# Patient Record
Sex: MALE | Race: WHITE | NOT HISPANIC OR LATINO | Employment: UNEMPLOYED | URBAN - NONMETROPOLITAN AREA
[De-identification: names, ages, dates, MRNs, and addresses within clinical notes are randomized per-mention and may not be internally consistent; named-entity substitution may affect disease eponyms.]

---

## 2023-03-23 ENCOUNTER — HOSPITAL ENCOUNTER (EMERGENCY)
Facility: HOSPITAL | Age: 23
Discharge: PSYCHIATRIC OR SUBSTANCE USE FACILITY | DRG: 897 | End: 2023-03-23
Attending: EMERGENCY MEDICINE | Admitting: EMERGENCY MEDICINE
Payer: COMMERCIAL

## 2023-03-23 ENCOUNTER — HOSPITAL ENCOUNTER (INPATIENT)
Facility: HOSPITAL | Age: 23
LOS: 3 days | Discharge: NURSING, REHAB OR LONG-TERM CARE | DRG: 897 | End: 2023-03-27
Attending: PSYCHIATRY & NEUROLOGY | Admitting: PSYCHIATRY & NEUROLOGY
Payer: COMMERCIAL

## 2023-03-23 VITALS
HEART RATE: 77 BPM | TEMPERATURE: 98.3 F | RESPIRATION RATE: 18 BRPM | HEIGHT: 68 IN | OXYGEN SATURATION: 98 % | WEIGHT: 135 LBS | BODY MASS INDEX: 20.46 KG/M2 | DIASTOLIC BLOOD PRESSURE: 72 MMHG | SYSTOLIC BLOOD PRESSURE: 134 MMHG

## 2023-03-23 DIAGNOSIS — F19.10 SUBSTANCE ABUSE: Primary | ICD-10-CM

## 2023-03-23 PROBLEM — F11.10 OPIATE ABUSE, CONTINUOUS: Status: ACTIVE | Noted: 2023-03-23

## 2023-03-23 LAB
ALBUMIN SERPL-MCNC: 4.6 G/DL (ref 3.5–5.2)
ALBUMIN/GLOB SERPL: 2.1 G/DL
ALP SERPL-CCNC: 82 U/L (ref 39–117)
ALT SERPL W P-5'-P-CCNC: 10 U/L (ref 1–41)
AMPHET+METHAMPHET UR QL: NEGATIVE
AMPHETAMINES UR QL: NEGATIVE
ANION GAP SERPL CALCULATED.3IONS-SCNC: 11.6 MMOL/L (ref 5–15)
APAP SERPL-MCNC: <5 MCG/ML (ref 0–30)
AST SERPL-CCNC: 17 U/L (ref 1–40)
BARBITURATES UR QL SCN: NEGATIVE
BASOPHILS # BLD AUTO: 0.06 10*3/MM3 (ref 0–0.2)
BASOPHILS NFR BLD AUTO: 0.7 % (ref 0–1.5)
BENZODIAZ UR QL SCN: NEGATIVE
BILIRUB SERPL-MCNC: 0.5 MG/DL (ref 0–1.2)
BUN SERPL-MCNC: 3 MG/DL (ref 6–20)
BUN/CREAT SERPL: 3.9 (ref 7–25)
BUPRENORPHINE SERPL-MCNC: NEGATIVE NG/ML
CALCIUM SPEC-SCNC: 9.5 MG/DL (ref 8.6–10.5)
CANNABINOIDS SERPL QL: POSITIVE
CHLORIDE SERPL-SCNC: 99 MMOL/L (ref 98–107)
CO2 SERPL-SCNC: 26.4 MMOL/L (ref 22–29)
COCAINE UR QL: NEGATIVE
CREAT SERPL-MCNC: 0.77 MG/DL (ref 0.76–1.27)
DEPRECATED RDW RBC AUTO: 37 FL (ref 37–54)
EGFRCR SERPLBLD CKD-EPI 2021: 131.4 ML/MIN/1.73
EOSINOPHIL # BLD AUTO: 0.17 10*3/MM3 (ref 0–0.4)
EOSINOPHIL NFR BLD AUTO: 1.9 % (ref 0.3–6.2)
ERYTHROCYTE [DISTWIDTH] IN BLOOD BY AUTOMATED COUNT: 11.7 % (ref 12.3–15.4)
ETHANOL BLD-MCNC: <10 MG/DL (ref 0–10)
ETHANOL UR QL: <0.01 %
FLUAV RNA RESP QL NAA+PROBE: NOT DETECTED
FLUBV RNA RESP QL NAA+PROBE: NOT DETECTED
GLOBULIN UR ELPH-MCNC: 2.2 GM/DL
GLUCOSE SERPL-MCNC: 107 MG/DL (ref 65–99)
HCT VFR BLD AUTO: 40.5 % (ref 37.5–51)
HGB BLD-MCNC: 14.3 G/DL (ref 13–17.7)
HOLD SPECIMEN: NORMAL
HOLD SPECIMEN: NORMAL
IMM GRANULOCYTES # BLD AUTO: 0.01 10*3/MM3 (ref 0–0.05)
IMM GRANULOCYTES NFR BLD AUTO: 0.1 % (ref 0–0.5)
LYMPHOCYTES # BLD AUTO: 2.64 10*3/MM3 (ref 0.7–3.1)
LYMPHOCYTES NFR BLD AUTO: 29.2 % (ref 19.6–45.3)
MCH RBC QN AUTO: 30.6 PG (ref 26.6–33)
MCHC RBC AUTO-ENTMCNC: 35.3 G/DL (ref 31.5–35.7)
MCV RBC AUTO: 86.5 FL (ref 79–97)
METHADONE UR QL SCN: NEGATIVE
MONOCYTES # BLD AUTO: 0.64 10*3/MM3 (ref 0.1–0.9)
MONOCYTES NFR BLD AUTO: 7.1 % (ref 5–12)
NEUTROPHILS NFR BLD AUTO: 5.52 10*3/MM3 (ref 1.7–7)
NEUTROPHILS NFR BLD AUTO: 61 % (ref 42.7–76)
NRBC BLD AUTO-RTO: 0 /100 WBC (ref 0–0.2)
OPIATES UR QL: NEGATIVE
OXYCODONE UR QL SCN: NEGATIVE
PCP UR QL SCN: NEGATIVE
PLATELET # BLD AUTO: 245 10*3/MM3 (ref 140–450)
PMV BLD AUTO: 10.3 FL (ref 6–12)
POTASSIUM SERPL-SCNC: 3.6 MMOL/L (ref 3.5–5.2)
PROPOXYPH UR QL: NEGATIVE
PROT SERPL-MCNC: 6.8 G/DL (ref 6–8.5)
RBC # BLD AUTO: 4.68 10*6/MM3 (ref 4.14–5.8)
SALICYLATES SERPL-MCNC: 0.4 MG/DL
SARS-COV-2 RNA RESP QL NAA+PROBE: NOT DETECTED
SODIUM SERPL-SCNC: 137 MMOL/L (ref 136–145)
TRICYCLICS UR QL SCN: NEGATIVE
WBC NRBC COR # BLD: 9.04 10*3/MM3 (ref 3.4–10.8)
WHOLE BLOOD HOLD COAG: NORMAL
WHOLE BLOOD HOLD SPECIMEN: NORMAL

## 2023-03-23 PROCEDURE — 87636 SARSCOV2 & INF A&B AMP PRB: CPT | Performed by: STUDENT IN AN ORGANIZED HEALTH CARE EDUCATION/TRAINING PROGRAM

## 2023-03-23 PROCEDURE — 93005 ELECTROCARDIOGRAM TRACING: CPT | Performed by: PSYCHIATRY & NEUROLOGY

## 2023-03-23 PROCEDURE — 80306 DRUG TEST PRSMV INSTRMNT: CPT | Performed by: STUDENT IN AN ORGANIZED HEALTH CARE EDUCATION/TRAINING PROGRAM

## 2023-03-23 PROCEDURE — 80179 DRUG ASSAY SALICYLATE: CPT | Performed by: STUDENT IN AN ORGANIZED HEALTH CARE EDUCATION/TRAINING PROGRAM

## 2023-03-23 PROCEDURE — 82077 ASSAY SPEC XCP UR&BREATH IA: CPT | Performed by: STUDENT IN AN ORGANIZED HEALTH CARE EDUCATION/TRAINING PROGRAM

## 2023-03-23 PROCEDURE — 36415 COLL VENOUS BLD VENIPUNCTURE: CPT

## 2023-03-23 PROCEDURE — 80143 DRUG ASSAY ACETAMINOPHEN: CPT | Performed by: STUDENT IN AN ORGANIZED HEALTH CARE EDUCATION/TRAINING PROGRAM

## 2023-03-23 PROCEDURE — C9803 HOPD COVID-19 SPEC COLLECT: HCPCS

## 2023-03-23 PROCEDURE — 99284 EMERGENCY DEPT VISIT MOD MDM: CPT

## 2023-03-23 PROCEDURE — 80053 COMPREHEN METABOLIC PANEL: CPT | Performed by: STUDENT IN AN ORGANIZED HEALTH CARE EDUCATION/TRAINING PROGRAM

## 2023-03-23 PROCEDURE — G0378 HOSPITAL OBSERVATION PER HR: HCPCS

## 2023-03-23 PROCEDURE — 85025 COMPLETE CBC W/AUTO DIFF WBC: CPT | Performed by: STUDENT IN AN ORGANIZED HEALTH CARE EDUCATION/TRAINING PROGRAM

## 2023-03-23 RX ORDER — ONDANSETRON 4 MG/1
4 TABLET, FILM COATED ORAL EVERY 6 HOURS PRN
Status: DISCONTINUED | OUTPATIENT
Start: 2023-03-23 | End: 2023-03-27 | Stop reason: HOSPADM

## 2023-03-23 RX ORDER — LOPERAMIDE HYDROCHLORIDE 2 MG/1
2 CAPSULE ORAL
Status: DISCONTINUED | OUTPATIENT
Start: 2023-03-23 | End: 2023-03-27 | Stop reason: HOSPADM

## 2023-03-23 RX ORDER — ALUMINA, MAGNESIA, AND SIMETHICONE 2400; 2400; 240 MG/30ML; MG/30ML; MG/30ML
15 SUSPENSION ORAL EVERY 6 HOURS PRN
Status: DISCONTINUED | OUTPATIENT
Start: 2023-03-23 | End: 2023-03-27 | Stop reason: HOSPADM

## 2023-03-23 RX ORDER — TRAZODONE HYDROCHLORIDE 50 MG/1
50 TABLET ORAL NIGHTLY PRN
Status: DISCONTINUED | OUTPATIENT
Start: 2023-03-23 | End: 2023-03-25

## 2023-03-23 RX ORDER — BENZTROPINE MESYLATE 1 MG/1
2 TABLET ORAL ONCE AS NEEDED
Status: DISCONTINUED | OUTPATIENT
Start: 2023-03-23 | End: 2023-03-27 | Stop reason: HOSPADM

## 2023-03-23 RX ORDER — ECHINACEA PURPUREA EXTRACT 125 MG
2 TABLET ORAL AS NEEDED
Status: DISCONTINUED | OUTPATIENT
Start: 2023-03-23 | End: 2023-03-27 | Stop reason: HOSPADM

## 2023-03-23 RX ORDER — BENZONATATE 100 MG/1
100 CAPSULE ORAL 3 TIMES DAILY PRN
Status: DISCONTINUED | OUTPATIENT
Start: 2023-03-23 | End: 2023-03-27 | Stop reason: HOSPADM

## 2023-03-23 RX ORDER — NICOTINE 21 MG/24HR
1 PATCH, TRANSDERMAL 24 HOURS TRANSDERMAL
Status: DISCONTINUED | OUTPATIENT
Start: 2023-03-24 | End: 2023-03-27 | Stop reason: HOSPADM

## 2023-03-23 RX ORDER — BENZTROPINE MESYLATE 1 MG/ML
1 INJECTION INTRAMUSCULAR; INTRAVENOUS ONCE AS NEEDED
Status: DISCONTINUED | OUTPATIENT
Start: 2023-03-23 | End: 2023-03-27 | Stop reason: HOSPADM

## 2023-03-23 RX ORDER — HYDROXYZINE 50 MG/1
50 TABLET, FILM COATED ORAL EVERY 6 HOURS PRN
Status: DISCONTINUED | OUTPATIENT
Start: 2023-03-23 | End: 2023-03-27 | Stop reason: HOSPADM

## 2023-03-23 RX ORDER — FAMOTIDINE 20 MG/1
20 TABLET, FILM COATED ORAL 2 TIMES DAILY PRN
Status: DISCONTINUED | OUTPATIENT
Start: 2023-03-23 | End: 2023-03-27 | Stop reason: HOSPADM

## 2023-03-23 RX ORDER — IBUPROFEN 400 MG/1
400 TABLET ORAL EVERY 6 HOURS PRN
Status: DISCONTINUED | OUTPATIENT
Start: 2023-03-23 | End: 2023-03-27 | Stop reason: HOSPADM

## 2023-03-23 RX ORDER — ACETAMINOPHEN 325 MG/1
650 TABLET ORAL EVERY 6 HOURS PRN
Status: DISCONTINUED | OUTPATIENT
Start: 2023-03-23 | End: 2023-03-27 | Stop reason: HOSPADM

## 2023-03-24 PROBLEM — F17.200 NICOTINE USE DISORDER: Status: ACTIVE | Noted: 2023-03-24

## 2023-03-24 PROBLEM — F11.20 OPIOID USE DISORDER, SEVERE, DEPENDENCE: Status: ACTIVE | Noted: 2023-03-23

## 2023-03-24 LAB
QT INTERVAL: 396 MS
QTC INTERVAL: 411 MS

## 2023-03-24 PROCEDURE — 63710000001 ONDANSETRON PER 8 MG: Performed by: PSYCHIATRY & NEUROLOGY

## 2023-03-24 PROCEDURE — G0378 HOSPITAL OBSERVATION PER HR: HCPCS

## 2023-03-24 PROCEDURE — 99222 1ST HOSP IP/OBS MODERATE 55: CPT | Performed by: PSYCHIATRY & NEUROLOGY

## 2023-03-24 RX ORDER — CLONIDINE HYDROCHLORIDE 0.1 MG/1
0.1 TABLET ORAL 2 TIMES DAILY PRN
Status: DISCONTINUED | OUTPATIENT
Start: 2023-03-27 | End: 2023-03-27 | Stop reason: HOSPADM

## 2023-03-24 RX ORDER — CLONIDINE HYDROCHLORIDE 0.1 MG/1
0.1 TABLET ORAL 3 TIMES DAILY PRN
Status: DISPENSED | OUTPATIENT
Start: 2023-03-26 | End: 2023-03-27

## 2023-03-24 RX ORDER — DICYCLOMINE HYDROCHLORIDE 10 MG/1
10 CAPSULE ORAL 3 TIMES DAILY PRN
Status: DISCONTINUED | OUTPATIENT
Start: 2023-03-24 | End: 2023-03-27 | Stop reason: HOSPADM

## 2023-03-24 RX ORDER — CLONIDINE HYDROCHLORIDE 0.1 MG/1
0.1 TABLET ORAL 4 TIMES DAILY PRN
Status: DISPENSED | OUTPATIENT
Start: 2023-03-25 | End: 2023-03-26

## 2023-03-24 RX ORDER — CLONIDINE HYDROCHLORIDE 0.1 MG/1
0.1 TABLET ORAL ONCE AS NEEDED
Status: DISCONTINUED | OUTPATIENT
Start: 2023-03-28 | End: 2023-03-27 | Stop reason: HOSPADM

## 2023-03-24 RX ORDER — CLONIDINE HYDROCHLORIDE 0.1 MG/1
0.1 TABLET ORAL 4 TIMES DAILY PRN
Status: DISPENSED | OUTPATIENT
Start: 2023-03-24 | End: 2023-03-25

## 2023-03-24 RX ORDER — CYCLOBENZAPRINE HCL 10 MG
10 TABLET ORAL 3 TIMES DAILY PRN
Status: DISCONTINUED | OUTPATIENT
Start: 2023-03-24 | End: 2023-03-27 | Stop reason: HOSPADM

## 2023-03-24 RX ORDER — HYDRALAZINE HYDROCHLORIDE 25 MG/1
25 TABLET, FILM COATED ORAL DAILY PRN
Status: DISCONTINUED | OUTPATIENT
Start: 2023-03-24 | End: 2023-03-27 | Stop reason: HOSPADM

## 2023-03-24 RX ADMIN — HYDROXYZINE HYDROCHLORIDE 50 MG: 50 TABLET, FILM COATED ORAL at 08:29

## 2023-03-24 RX ADMIN — TRAZODONE HYDROCHLORIDE 50 MG: 50 TABLET ORAL at 21:02

## 2023-03-24 RX ADMIN — CYCLOBENZAPRINE 10 MG: 10 TABLET, FILM COATED ORAL at 21:03

## 2023-03-24 RX ADMIN — IBUPROFEN 400 MG: 400 TABLET, FILM COATED ORAL at 08:29

## 2023-03-24 RX ADMIN — ACETAMINOPHEN 650 MG: 325 TABLET ORAL at 14:03

## 2023-03-24 RX ADMIN — ONDANSETRON HYDROCHLORIDE 4 MG: 4 TABLET, FILM COATED ORAL at 14:03

## 2023-03-24 RX ADMIN — IBUPROFEN 400 MG: 400 TABLET, FILM COATED ORAL at 21:03

## 2023-03-24 RX ADMIN — HYDROXYZINE HYDROCHLORIDE 50 MG: 50 TABLET, FILM COATED ORAL at 21:03

## 2023-03-24 RX ADMIN — HYDROXYZINE HYDROCHLORIDE 50 MG: 50 TABLET, FILM COATED ORAL at 00:12

## 2023-03-24 RX ADMIN — CLONIDINE HYDROCHLORIDE 0.1 MG: 0.1 TABLET ORAL at 21:02

## 2023-03-24 RX ADMIN — TRAZODONE HYDROCHLORIDE 50 MG: 50 TABLET ORAL at 00:12

## 2023-03-24 RX ADMIN — HYDROXYZINE HYDROCHLORIDE 50 MG: 50 TABLET, FILM COATED ORAL at 14:03

## 2023-03-24 RX ADMIN — FAMOTIDINE 20 MG: 20 TABLET, FILM COATED ORAL at 08:29

## 2023-03-24 NOTE — ED PROVIDER NOTES
Subjective   History of Present Illness  Patient is a 22-year-old male who was just admitted to [facility] residential rehab today for his fentanyl use.  They sent him here for medical detox.  Patient reports his last use was this morning.  He denies any symptoms.  States he feels fine, completely normal.  No withdrawal symptoms, at least as of yet.  He denies suicidal homicidal ideations, auditory visual loose Nations, other symptoms or other complaints.  He reports that he has never used intravenously, he smokes the fentanyl.        Review of Systems   All other systems reviewed and are negative.      Past Medical History:   Diagnosis Date   • Substance abuse (HCC)    • Withdrawal symptoms, drug or narcotic (HCC)        No Known Allergies    History reviewed. No pertinent surgical history.    History reviewed. No pertinent family history.    Social History     Socioeconomic History   • Marital status: Single   Tobacco Use   • Smoking status: Never   • Smokeless tobacco: Never   Vaping Use   • Vaping Use: Every day   • Substances: Nicotine, Flavoring   • Devices: Disposable   Substance and Sexual Activity   • Alcohol use: Never   • Drug use: Yes     Types: Fentanyl   • Sexual activity: Yes     Partners: Female           Objective   Physical Exam  Vitals and nursing note reviewed.   Constitutional:       General: He is not in acute distress.     Appearance: Normal appearance. He is well-developed. He is not ill-appearing, toxic-appearing or diaphoretic.   HENT:      Head: Normocephalic and atraumatic.   Eyes:      General: No scleral icterus.     Pupils: Pupils are equal, round, and reactive to light.   Neck:      Trachea: No tracheal deviation.   Cardiovascular:      Rate and Rhythm: Normal rate and regular rhythm.   Pulmonary:      Effort: Pulmonary effort is normal. No respiratory distress.      Breath sounds: Normal breath sounds.   Chest:      Chest wall: No tenderness.   Abdominal:      General: Bowel sounds are  normal.      Palpations: Abdomen is soft.      Tenderness: There is no abdominal tenderness. There is no guarding or rebound.   Musculoskeletal:         General: No tenderness. Normal range of motion.      Cervical back: Normal range of motion and neck supple. No rigidity or tenderness.      Right lower leg: No edema.      Left lower leg: No edema.   Skin:     General: Skin is warm and dry.      Capillary Refill: Capillary refill takes less than 2 seconds.      Coloration: Skin is not pale.   Neurological:      General: No focal deficit present.      Mental Status: He is alert and oriented to person, place, and time.      GCS: GCS eye subscore is 4. GCS verbal subscore is 5. GCS motor subscore is 6.      Motor: No abnormal muscle tone.   Psychiatric:         Mood and Affect: Mood normal.         Behavior: Behavior normal.         Procedures  Results for orders placed or performed during the hospital encounter of 03/23/23   COVID-19 and FLU A/B PCR - Swab, Nasopharynx    Specimen: Nasopharynx; Swab   Result Value Ref Range    COVID19 Not Detected Not Detected - Ref. Range    Influenza A PCR Not Detected Not Detected    Influenza B PCR Not Detected Not Detected   Comprehensive Metabolic Panel    Specimen: Arm, Right; Blood   Result Value Ref Range    Glucose 107 (H) 65 - 99 mg/dL    BUN 3 (L) 6 - 20 mg/dL    Creatinine 0.77 0.76 - 1.27 mg/dL    Sodium 137 136 - 145 mmol/L    Potassium 3.6 3.5 - 5.2 mmol/L    Chloride 99 98 - 107 mmol/L    CO2 26.4 22.0 - 29.0 mmol/L    Calcium 9.5 8.6 - 10.5 mg/dL    Total Protein 6.8 6.0 - 8.5 g/dL    Albumin 4.6 3.5 - 5.2 g/dL    ALT (SGPT) 10 1 - 41 U/L    AST (SGOT) 17 1 - 40 U/L    Alkaline Phosphatase 82 39 - 117 U/L    Total Bilirubin 0.5 0.0 - 1.2 mg/dL    Globulin 2.2 gm/dL    A/G Ratio 2.1 g/dL    BUN/Creatinine Ratio 3.9 (L) 7.0 - 25.0    Anion Gap 11.6 5.0 - 15.0 mmol/L    eGFR 131.4 >60.0 mL/min/1.73   Acetaminophen Level    Specimen: Arm, Right; Blood   Result Value Ref  Range    Acetaminophen <5.0 0.0 - 30.0 mcg/mL   Ethanol    Specimen: Arm, Right; Blood   Result Value Ref Range    Ethanol <10 0 - 10 mg/dL    Ethanol % <0.010 %   Salicylate Level    Specimen: Arm, Right; Blood   Result Value Ref Range    Salicylate 0.4 <=30.0 mg/dL   Urine Drug Screen - Urine, Clean Catch    Specimen: Urine, Clean Catch   Result Value Ref Range    THC, Screen, Urine Positive (A) Negative    Phencyclidine (PCP), Urine Negative Negative    Cocaine Screen, Urine Negative Negative    Methamphetamine, Ur Negative Negative    Opiate Screen Negative Negative    Amphetamine Screen, Urine Negative Negative    Benzodiazepine Screen, Urine Negative Negative    Tricyclic Antidepressants Screen Negative Negative    Methadone Screen, Urine Negative Negative    Barbiturates Screen, Urine Negative Negative    Oxycodone Screen, Urine Negative Negative    Propoxyphene Screen Negative Negative    Buprenorphine, Screen, Urine Negative Negative   CBC Auto Differential    Specimen: Arm, Right; Blood   Result Value Ref Range    WBC 9.04 3.40 - 10.80 10*3/mm3    RBC 4.68 4.14 - 5.80 10*6/mm3    Hemoglobin 14.3 13.0 - 17.7 g/dL    Hematocrit 40.5 37.5 - 51.0 %    MCV 86.5 79.0 - 97.0 fL    MCH 30.6 26.6 - 33.0 pg    MCHC 35.3 31.5 - 35.7 g/dL    RDW 11.7 (L) 12.3 - 15.4 %    RDW-SD 37.0 37.0 - 54.0 fl    MPV 10.3 6.0 - 12.0 fL    Platelets 245 140 - 450 10*3/mm3    Neutrophil % 61.0 42.7 - 76.0 %    Lymphocyte % 29.2 19.6 - 45.3 %    Monocyte % 7.1 5.0 - 12.0 %    Eosinophil % 1.9 0.3 - 6.2 %    Basophil % 0.7 0.0 - 1.5 %    Immature Grans % 0.1 0.0 - 0.5 %    Neutrophils, Absolute 5.52 1.70 - 7.00 10*3/mm3    Lymphocytes, Absolute 2.64 0.70 - 3.10 10*3/mm3    Monocytes, Absolute 0.64 0.10 - 0.90 10*3/mm3    Eosinophils, Absolute 0.17 0.00 - 0.40 10*3/mm3    Basophils, Absolute 0.06 0.00 - 0.20 10*3/mm3    Immature Grans, Absolute 0.01 0.00 - 0.05 10*3/mm3    nRBC 0.0 0.0 - 0.2 /100 WBC   Green Top (Gel)   Result Value  Ref Range    Extra Tube Hold for add-ons.    Lavender Top   Result Value Ref Range    Extra Tube hold for add-on    Gold Top - SST   Result Value Ref Range    Extra Tube Hold for add-ons.    Light Blue Top   Result Value Ref Range    Extra Tube Hold for add-ons.               ED Course  ED Course as of 03/23/23 2244   Thu Mar 23, 2023   2243 Patient's emergency department stay has been uneventful.  He has shown no signs of acute distress.  He has been evaluated by psychiatry intake.  Patient is being admitted to the detox unit/discharged to behavioral health. [CM]      ED Course User Index  [CM] Munir León MD                                           [facility]    Final diagnoses:   Substance abuse (HCC)       ED Disposition  ED Disposition     ED Disposition   DC/Transfer to Behavioral Health    Condition   Stable    Comment   --             Please note that portions of this note were completed with a voice recognition program.            Munir León MD  03/23/23 2245

## 2023-03-24 NOTE — NURSING NOTE
"Patient came to nursing station requesting to leave AMA. Patient then called his mother and she wouldn't come and get him. He made the statement \"I ain't about sitting here.\" This RN, explained to patient that he could leave AMA I just had to get a hold of the DrHailey And get an order. Patient states, \"thats alright.\" This RN, ask patient so are you staying or are you wanting to discharge AMA. Patient says he is staying then states, \"this is like [facility] I would rather be in [facility].\" \"Whats the point in me sitting here. Explained to patient that the rehab wanted him to be medically cleared to make sure he safely detoxed from fentanyl.   "

## 2023-03-24 NOTE — NURSING NOTE
Patient brought by [facility] for medical detox. Patient reports going to senior living in 2022, then to rehab in 2023. He reports relapse a couple of days after leaving rehab. He began using opiates at age 19. He has been smoking Fentanyl most recently in the last week- amounts unknown. He reports occassional past use of MJ. Patient was raised by his grandparents who he calls his parents. His father  when patient was 7 years old. Patient reports no contact with mother. Anxiety and depression rated 8/10, Craving rated 0/10, COWS score: 2. He reports good appetite. He reports sleeping one hour last night and one hour for the last several nights. He has taken Zoloft in the past for A/D. Most recently he was prescribed Seroquel and Buspar. He plans on returning to [facility] for court ordered rehab.

## 2023-03-24 NOTE — ED NOTES
MEDICAL SCREENING:    Reason for Visit: detox    Patient initially seen in triage.  The patient was advised further evaluation and diagnostic testing will be needed, some of the treatment and testing will be initiated in the lobby in order to begin the process.  The patient will be returned to the waiting area for the time being and possibly be re-assessed by a subsequent ED provider.  The patient will be brought back to the treatment area in as timely manner as possible.       Leola Saenz MD  03/23/23 2032

## 2023-03-24 NOTE — PLAN OF CARE
Goal Outcome Evaluation:           Problem: Adult Behavioral Health Plan of Care  Goal: Patient-Specific Goal (Individualization)  Outcome: Ongoing, Progressing  Flowsheets  Taken 3/24/2023 1431 by Katharine Patterson CSW  Patient-Specific Goals (Include Timeframe): Identify 2-3 coping skills, address relapse prevention methods, complete aftercare plans, and deny SI/HI prior to discharge.  Individualized Care Needs: Therapist to offer 1-4 therapy sessions, aftercare planning, safety planning, family education, group therapy, and brief CBT/MI interventions.  Taken 3/24/2023 1335 by Katharine Patterson CSW  Patient Personal Strengths:   resilient   resourceful   positive educational history   stable living environment   spiritual/Quaker support   intellectual cognitive skills   motivated for recovery   motivated for treatment   family/social support  Patient Vulnerabilities:   substance abuse/addiction   poor impulse control   occupational insecurity   history of unsuccessful treatment   legal concerns  Taken 3/24/2023 0010 by Liberty Figueredo RN  Anxieties, Fears or Concerns: none reported  Goal: Optimized Coping Skills in Response to Life Stressors  Outcome: Ongoing, Progressing  Flowsheets (Taken 3/24/2023 1431)  Optimized Coping Skills in Response to Life Stressors: making progress toward outcome  Intervention: Promote Effective Coping Strategies  Flowsheets (Taken 3/24/2023 1431)  Supportive Measures:   active listening utilized   counseling provided   decision-making supported   goal-setting facilitated   verbalization of feelings encouraged   positive reinforcement provided   self-responsibility promoted   self-reflection promoted   self-care encouraged  Goal: Develops/Participates in Therapeutic [place] to Support Successful Transition  Outcome: Ongoing, Progressing  Flowsheets (Taken 3/24/2023 1431)  Develops/Participates in Therapeutic [place] to Support Successful Transition: making progress toward  outcome  Intervention: Foster Therapeutic [place]  Flowsheets (Taken 3/24/2023 0825 by Tracie Marques RN)  Trust Relationship/Rapport:   care explained   choices provided   emotional support provided   empathic listening provided   questions answered   questions encouraged   reassurance provided   thoughts/feelings acknowledged  Intervention: Mutually Develop Transition Plan  Flowsheets  Taken 3/24/2023 1431  Outpatient/Agency/Support Group Needs: residential services  Transition Support:   follow-up care discussed   follow-up care coordinated   crisis management plan verbalized   crisis management plan promoted   community resources reviewed  Anticipated Discharge Disposition: residential substance use unit  Taken 3/24/2023 1430  Discharge Coordination/Progress: Patient has [employer]. Therapist met with patient to complete assessment. Patient to return to [facility], [place] will transport.  Transportation Anticipated: agency  Transportation Concerns: (license suspended) other (see comments)  Current Discharge Risk:   substance use/abuse   psychiatric illness   legal concerns  Concerns to be Addressed:   mental health   substance/tobacco abuse/use   coping/stress   adjustment to diagnosis/illness   discharge planning  Readmission Within the Last 30 Days: no previous admission in last 30 days  Patient/Family Anticipated Services at Transition: rehabilitation services  Patient's Choice of Community Agency(s): [facility]  Patient/Family Anticipates Transition to: inpatient rehabilitation facility  Offered/Gave Vendor List: no      DATA:         Therapist met individually with patient this date to introduce role and to discuss hospitalization expectations. Patient agreeable.     Consent signed for [facility]  Declines family involvement     Clinical Maneuvering/Intervention:     Therapist assisted patient in processing above session content; acknowledged and normalized patient’s thoughts,  feelings, and concerns.  Discussed the therapist/patient relationship and explain the parameters and limitations of relative confidentiality.  Also discussed the importance of active participation, and honesty to the treatment process.  Encouraged the patient to discuss/vent their feelings, frustrations, and fears concerning their ongoing medical issues and validated their feelings.     Discussed the importance of finding enjoyable activities and coping skills that the patient can engage in a regular basis. Discussed healthy coping skills such as distraction, self love, grounding, thought challenges/reframing, etc.  Provided patient with list of healthy coping skills this date. Discussed the importance of medication compliance.  Praised the patient for seeking help and spent the majority of the session building rapport.       Allowed patient to freely discuss issues without interruption or judgment. Provided safe, confidential environment to facilitate the development of positive therapeutic relationship and encourage open, honest communication.      Therapist addressed discharge safety planning this date. Assisted patient in identifying risk factors which would indicate the need for higher level of care after discharge;  including thoughts to harm self or others and/or self-harming behavior. Encouraged patient to call 911, or present to the nearest emergency room should any of these events occur. Discussed crisis intervention services and means to access.  Encouraged securing any objects of harm.       Therapist completed integrated summary, treatment plan, and initiated social history this date.  Therapist is strongly encouraging family involvement in treatment.       ASSESSMENT:      The patient is a 23 y/o  male admitted to observation for opiate detox treatment. Therapist met with patient 1-1 at bedside on this date to complete assessment. Patient calm and cooperative, would not turn over to face  therapist during session. Patient reports high anxiety, denies current SI/HI/AVH. Patient is very focused on getting more meds. Patient reports experiencing body aches, muscle aches, insomnia, and restlessness. Patient reports use of fentanyl, as much as he can get. Patient declines family involvement in treatment plan. Patient has no past admissions to the [facility]. Patient recently got out of [facility] in November of last year and then spent some time in rehab at the beginning of this year. He relapsed a couple days after leaving rehab and has been living with his mom since then. Patient is court ordered to [facility] and is agreeable to return at discharge, consent given. Patient reports he began using at age 16 and longest period of sobriety is reported to be three months. [facility] will provide transportation.      PLAN:       Patient to remain hospitalized this date.     Treatment team will focus efforts on stabilizing patient's acute symptoms while providing education on healthy coping and crisis management to reduce hospitalizations.   Patient requires daily psychiatrist evaluation and 24/7 nursing supervision to promote patient  safety.     Therapist will offer 1-4 individual sessions, 1 therapy group daily, family education, and appropriate referral.    Therapist recommends return to [facility], patient agreeable.

## 2023-03-24 NOTE — PLAN OF CARE
Goal Outcome Evaluation:  Plan of Care Reviewed With: patient  Patient Agreement with Plan of Care: agrees     Progress: improving  Outcome Evaluation: Patient irritable and restless. Rates anxiety 10/10. Depression 9/10. Withdrawal symptoms: body aches, tremors, sweats, leg cramps, and stomach cramps. Denies cravings, SI, HI, or [place]. No other issues noted at this time. Will continue to monitor.

## 2023-03-24 NOTE — PLAN OF CARE
Goal Outcome Evaluation:  Plan of Care Reviewed With: patient  Patient Agreement with Plan of Care: agrees     Progress: improving     Pt new admit for pm shift. Given atarax and trazodone prn medications. Pt slept well.

## 2023-03-24 NOTE — NURSING NOTE
Pt court ordered to detox, states he's detoxing from Fentanyl    Fentanyl-pt states unsure of amount, by smoking, has been using it for 1 week, last used this AM. Was clean for about 3 and a half months prior.     COWS-4    Craving-0/10    Pt states poor sleep, good appetite    Depression-8/10  Anxiety-8/10

## 2023-03-24 NOTE — NURSING NOTE
Spoke to doctor Adamson intake information labs and V/S provided and discussed with provider, instructed to admit with routine [facility] Detox Observation orders [id number]. Patient and ER provider made aware of admitting orders and plan of care.

## 2023-03-24 NOTE — NURSING NOTE
Pt ambulatory from triage. Denies SI/HI/AVH. Pt states he is court ordered rehab and sent here for detox from fentanyl. Pt refuses to go through search process, or scrubs.     Reviewed process and pt decided to stay.

## 2023-03-24 NOTE — H&P
INITIAL PSYCHIATRIC HISTORY & PHYSICAL    Patient Identification:  Name:  Paresh Bansal  Age:  22 y.o.  Sex:  male  :  2000  MRN:  [id number]   Visit Number:  [id number]  Primary Care Physician:  Provider, No Known    SUBJECTIVE    CC/Focus of Exam: opioid use    HPI: Paresh Bansal is a 22 y.o. male who was admitted on 3/23/2023 with complaints of opioid use and withdrawals. The patient reports a long history of substance use. First use was at age 16 years when he used Xanax and at age 18 years he switched over to pain pills. Over time the use increased and the patient  continued to use despite negative consequences. The patient endorses symptoms of tolerance and withdrawals. Has tried to cut down and stop but has not been successful. Spends too much time and resources in pursuit of substance use. Longest period of sobriety is reported to be 3 months.  Currently using fentanyl as much as he can get, usually smokes it.  Last use was yesterday evening.  Withdrawal symptoms muscle aches, body aches, insomnia, feel horrible, restless, anxious.     PAST PSYCHIATRIC HX: one reported.    SUBSTANCE USE HX: See HPI for fentanyl use. Vapes nicotine daily.    SOCIAL HX:   Social History     Socioeconomic History   • Marital status: Single   • Number of children: 0   • Highest education level: High school graduate   Tobacco Use   • Smoking status: Every Day     Packs/day: 1.00     Years: 8.00     Pack years: 8.00     Types: Cigarettes   • Smokeless tobacco: Never   • Tobacco comments:     Started smoking at age 89   Vaping Use   • Vaping Use: Every day   • Substances: Nicotine, Flavoring   • Devices: Disposable   Substance and Sexual Activity   • Alcohol use: Never   • Drug use: Yes     Types: Fentanyl, Marijuana   • Sexual activity: Yes     Partners: Female         Past Medical History:   Diagnosis Date   • Anxiety    • Depression    • Substance abuse (HCC)    • Withdrawal symptoms, drug or narcotic (HCC)          History reviewed. No pertinent surgical history.    History reviewed. No pertinent family history.      No medications prior to admission.         ALLERGIES:  Patient has no known allergies.    Temp:  [97.4 °F (36.3 °C)-98.3 °F (36.8 °C)] 97.9 °F (36.6 °C)  Heart Rate:  [69-77] 69  Resp:  [16-18] 16  BP: (110-134)/(72-81) 110/72    REVIEW OF SYSTEMS:  Review of Systems   Constitutional: Positive for fatigue.   HENT: Negative.    Eyes: Negative.    Respiratory: Negative.    Cardiovascular: Negative.    Gastrointestinal: Positive for abdominal pain.   Musculoskeletal: Positive for arthralgias and myalgias.   Skin: Negative.    Allergic/Immunologic: Negative.    Neurological: Positive for weakness.   Hematological: Negative.    Psychiatric/Behavioral: Positive for dysphoric mood. The patient is nervous/anxious.         OBJECTIVE    PHYSICAL EXAM:  Physical Exam  Constitutional:  Appears well-developed and well-nourished.   HENT:   Head: Normocephalic and atraumatic.   Right Ear: External ear normal.   Left Ear: External ear normal.   Mouth/Throat: Oropharynx is clear and moist.   Eyes: Pupils are equal, round, and reactive to light. Conjunctivae and EOM are normal.   Neck: Normal range of motion. Neck supple.   Cardiovascular: Normal rate, regular rhythm and normal heart sounds.    Respiratory: Effort normal and breath sounds normal. No respiratory distress. No wheezes.   GI: Soft. Bowel sounds are normal.No distension. There is no tenderness.   Musculoskeletal: Normal range of motion. No edema or deformity.   Neurological:No cranial nerve deficit. Coordination normal.   Skin: Skin is warm and dry. No rash noted. No erythema.       MENTAL STATUS EXAM:   Hygiene:   fair  Cooperation:  Cooperative  Eye Contact:  Fair  Psychomotor Behavior:  Appropriate  Affect:  Appropriate  Hopelessness: Denies  Speech:  Normal  Thought Process: Goal directed  Thought Content:  Normal  Suicidal:  None  Homicidal:  None  Hallucinations:   None  Delusion:  None  Memory:  Intact  Orientation:  Person, Place, Time and Situation  Reliability:  fair  Insight:  Fair  Judgement:  Fair  Impulse Control:  Fair    Imaging Results (Last 24 Hours)     ** No results found for the last 24 hours. **           ECG/EMG Results (most recent)     Procedure Component Value Units Date/Time    ECG 12 Lead Other [[id number]] Collected: 03/23/23 2357     Updated: 03/23/23 2359     QT Interval 396 ms      QTC Interval 411 ms     Narrative:      Test Reason : Other~  Blood Pressure :   */*   mmHG  Vent. Rate :  65 BPM     Atrial Rate :  65 BPM     P-R Int : 122 ms          QRS Dur :  90 ms      QT Int : 396 ms       P-R-T Axes :  -3  77  44 degrees     QTc Int : 411 ms    Normal sinus rhythm  Nonspecific T wave abnormality  Abnormal ECG  No previous ECGs available    Referred By:            Confirmed By:            Lab Results   Component Value Date    GLUCOSE 107 (H) 03/23/2023    BUN 3 (L) 03/23/2023    CREATININE 0.77 03/23/2023    BCR 3.9 (L) 03/23/2023    CO2 26.4 03/23/2023    CALCIUM 9.5 03/23/2023    ALBUMIN 4.6 03/23/2023    AST 17 03/23/2023    ALT 10 03/23/2023       Lab Results   Component Value Date    WBC 9.04 03/23/2023    HGB 14.3 03/23/2023    HCT 40.5 03/23/2023    MCV 86.5 03/23/2023     03/23/2023       Last Urine Toxicity     LAST URINE TOXICITY RESULTS Latest Ref Rng & Units 3/23/2023    AMPHETAMINES SCREEN, URINE Negative Negative    BARBITURATES SCREEN Negative Negative    BENZODIAZEPINE SCREEN, URINE Negative Negative    BUPRENORPHINEUR Negative Negative    COCAINE SCREEN, URINE Negative Negative    METHADONE SCREEN, URINE Negative Negative    METHAMPHETAMINEUR Negative Negative          Brief Urine Lab Results     None          DATA  Labs reviewed. Glucose 107. THC positive.   EKG reviewed. QTc 411.   AURELIA reviewed. No active controlled rx noted.   Record reviewed. No previous treatment noted in this hospital for mental health or substance  use problems.       Strengths: Motivated for treatment    Weaknesses:Poor coping skills    Code status:  Full  Discussed code status with patient.    ASSESSMENT & PLAN:        Opioid use disorder, severe, dependence (HCC)    Nicotine use disorder        The patient has been admitted under observation status for safety and stabilization.  Patient will be monitored for withdrawals daily and maintained on Special Precautions Level 4 (q30 min checks).  His status will be changed to inpatient and started on clonidine detox once the withdrawal symptoms emerge. Will continue to monitor.

## 2023-03-24 NOTE — PROGRESS NOTES
Navigator is helping with the following referral:    [facility] [phone]  -Spoke with [facility]. They will accept patient at discharge and provide transportation.  3/24

## 2023-03-25 PROCEDURE — 63710000001 ONDANSETRON PER 8 MG: Performed by: PSYCHIATRY & NEUROLOGY

## 2023-03-25 PROCEDURE — 99232 SBSQ HOSP IP/OBS MODERATE 35: CPT | Performed by: PSYCHIATRY & NEUROLOGY

## 2023-03-25 PROCEDURE — G0378 HOSPITAL OBSERVATION PER HR: HCPCS

## 2023-03-25 RX ORDER — TRAZODONE HYDROCHLORIDE 50 MG/1
100 TABLET ORAL NIGHTLY PRN
Status: DISCONTINUED | OUTPATIENT
Start: 2023-03-25 | End: 2023-03-26

## 2023-03-25 RX ADMIN — IBUPROFEN 400 MG: 400 TABLET, FILM COATED ORAL at 14:32

## 2023-03-25 RX ADMIN — IBUPROFEN 400 MG: 400 TABLET, FILM COATED ORAL at 08:06

## 2023-03-25 RX ADMIN — DICYCLOMINE HYDROCHLORIDE 10 MG: 10 CAPSULE ORAL at 08:06

## 2023-03-25 RX ADMIN — ONDANSETRON HYDROCHLORIDE 4 MG: 4 TABLET, FILM COATED ORAL at 21:08

## 2023-03-25 RX ADMIN — ONDANSETRON HYDROCHLORIDE 4 MG: 4 TABLET, FILM COATED ORAL at 14:32

## 2023-03-25 RX ADMIN — CYCLOBENZAPRINE 10 MG: 10 TABLET, FILM COATED ORAL at 08:06

## 2023-03-25 RX ADMIN — CLONIDINE HYDROCHLORIDE 0.1 MG: 0.1 TABLET ORAL at 08:06

## 2023-03-25 RX ADMIN — HYDROXYZINE HYDROCHLORIDE 50 MG: 50 TABLET, FILM COATED ORAL at 08:06

## 2023-03-25 RX ADMIN — TRAZODONE HYDROCHLORIDE 100 MG: 50 TABLET ORAL at 21:08

## 2023-03-25 RX ADMIN — HYDROXYZINE HYDROCHLORIDE 50 MG: 50 TABLET, FILM COATED ORAL at 14:32

## 2023-03-25 RX ADMIN — ONDANSETRON HYDROCHLORIDE 4 MG: 4 TABLET, FILM COATED ORAL at 08:06

## 2023-03-25 NOTE — PLAN OF CARE
Goal Outcome Evaluation:  Plan of Care Reviewed With: patient  Patient Agreement with Plan of Care: agrees     Progress: improving     Pt irritable, agitated, and tense. Pt had difficulty falling and staying asleep. Given Clonidine, Atarax, Flexeril, Motrin and Trazodone prn medications.    Spontaneous, unlabored and symmetrical

## 2023-03-25 NOTE — PROGRESS NOTES
"   Detox Recovery Unit Progress Note   Clinician: Tristen Adamson MD  Admission Date: 3/23/2023  07:47 EDT 03/25/23    Behavioral Health Treatment Plan and Problem List: I have reviewed and approved the Behavioral Health Treatment Plan and Problem list.    Allergies  No Known Allergies    Hospital Day: 0 days      Assessment completed within view of staff    History  CC: withdrawal symptoms    Interval HPI: Patient seen and evaluated by me.  Chart reviewed. Patient is withdrawing from opioids.  Current withdrawal symptoms include: muscle cramping in bilateral arms, fatigue, and diarrhea. He complains of difficulty with sleep.    ROS otherwise as below.        Interval Review of Systems:   General ROS: negative for - fever.  Positive for withdrawal symptoms mentioned above.  Endocrine ROS: negative for - palpitations  Respiratory ROS: no cough, shortness of breath, or wheezing  Cardiovascular ROS: no chest pain or dyspnea on exertion  Gastrointestinal ROS: no abdominal pain,no black or bloody stools    /75 (BP Location: Right arm, Patient Position: Sitting)   Pulse 80   Temp 98 °F (36.7 °C) (Temporal)   Resp 18   Ht 172.7 cm (68\")   Wt 60.1 kg (132 lb 6.4 oz)   SpO2 97%   BMI 20.13 kg/m²     Mental Status Exam  Mood: anxious and dysphoric  Affect: mood-congruent   Thought Processes: linear, logical, and goal directed  Oriented X3:  yes  Thought Content: sensorium intact   Suicidal Thoughts: denies  Homicidal Thoughts: denies        Medical Decision Making:   Labs:     Lab Results (last 24 hours)     ** No results found for the last 24 hours. **            Radiology:     Imaging Results (Last 24 Hours)     ** No results found for the last 24 hours. **            EKG:     ECG/EMG Results (most recent)     Procedure Component Value Units Date/Time    ECG 12 Lead Other [[id number]] Collected: 03/23/23 8351     Updated: 03/24/23 1334     QT Interval 396 ms      QTC Interval 411 ms     Narrative:      Test " Reason : Other~  Blood Pressure :   */*   mmHG  Vent. Rate :  65 BPM     Atrial Rate :  65 BPM     P-R Int : 122 ms          QRS Dur :  90 ms      QT Int : 396 ms       P-R-T Axes :  -3  77  44 degrees     QTc Int : 411 ms    Normal sinus rhythm  Nonspecific T wave abnormality  Abnormal ECG  No previous ECGs available  Confirmed by Tiny Epstein ([id number]) on 3/24/2023 1:33:56 PM    Referred By:            Confirmed By: Tiny Epstein           Medications:  nicotine, 1 patch, Transdermal, Q24H           All medications reviewed.      Assessment and Plan:    Opioid use disorder, severe, dependence (HCC)  -Continue Clonidine detox protocol    Difficulty with sleep  - increase trazodone to 100mg QHS     Nicotine use disorder  - Encourage cessation        Continue hospitalization for medical management of drug withdrawal and patient safety and stabilization.  Continue individual and group chemical dependency counseling.   Therapist and treatment team will continue to assist patient in establishing plans for follow-up and rehabilitation that will serve as the next step in care once patient has completed the medical detox.    This note was generated by a scribeAdi. The work documented in this note was completed, reviewed, and approved by the attending psychiatrist as designated Dr. Tristen Adamson electronic signature.     ITristen MD, personally performed the services described in this documentation as scribed by the above named individual and is both accurate and complete.

## 2023-03-25 NOTE — PLAN OF CARE
Problem: Adult Behavioral Health Plan of Care  Goal: Plan of Care Review  Outcome: Ongoing, Progressing  Flowsheets (Taken 3/25/2023 5448)  Progress: no change  Plan of Care Reviewed With: patient  Patient Agreement with Plan of Care: agrees  Outcome Evaluation: Pt calm and cooperative.   Goal Outcome Evaluation:  Plan of Care Reviewed With: patient  Patient Agreement with Plan of Care: agrees     Progress: no change  Outcome Evaluation: Pt calm and cooperative.

## 2023-03-25 NOTE — NURSING NOTE
Loud banging noise coming from patient room; previously when this occurred pt had denied making the noise, but agreed to stop when told to do so by this nurse. Pt then yelling, and banging on the wall again. Contacted security to discuss acceptable behavior while on unit.

## 2023-03-25 NOTE — NURSING NOTE
"Dr. Delgado contacted related to patient report of increased muscle cramping, restlessness, and diarrhea.  Pt sts, \"I am having a hard time sitting still and it my legs keep cramping up.\"    New orders given:  Pt changed from observation to admit status  Initiate Clonidine detox protocol  "

## 2023-03-25 NOTE — NURSING NOTE
"Pt states he uses 2 30mg percocets pressed with unknown amount of fentanyl daily.States he \"smokes the pills\".Asked pt if he put the pills in marijuana to smoke them and he states no that he smokes them in a pipe or \"whatever\".Pt's drug screen is positive for THC.  "

## 2023-03-26 PROCEDURE — 99232 SBSQ HOSP IP/OBS MODERATE 35: CPT | Performed by: PSYCHIATRY & NEUROLOGY

## 2023-03-26 PROCEDURE — G0378 HOSPITAL OBSERVATION PER HR: HCPCS

## 2023-03-26 PROCEDURE — 63710000001 ONDANSETRON PER 8 MG: Performed by: PSYCHIATRY & NEUROLOGY

## 2023-03-26 RX ORDER — TRAZODONE HYDROCHLORIDE 50 MG/1
150 TABLET ORAL NIGHTLY PRN
Status: DISCONTINUED | OUTPATIENT
Start: 2023-03-26 | End: 2023-03-27 | Stop reason: HOSPADM

## 2023-03-26 RX ADMIN — CLONIDINE HYDROCHLORIDE 0.1 MG: 0.1 TABLET ORAL at 21:06

## 2023-03-26 RX ADMIN — HYDROXYZINE HYDROCHLORIDE 50 MG: 50 TABLET, FILM COATED ORAL at 08:13

## 2023-03-26 RX ADMIN — IBUPROFEN 400 MG: 400 TABLET, FILM COATED ORAL at 14:49

## 2023-03-26 RX ADMIN — TRAZODONE HYDROCHLORIDE 150 MG: 50 TABLET ORAL at 21:06

## 2023-03-26 RX ADMIN — IBUPROFEN 400 MG: 400 TABLET, FILM COATED ORAL at 08:13

## 2023-03-26 RX ADMIN — ONDANSETRON HYDROCHLORIDE 4 MG: 4 TABLET, FILM COATED ORAL at 08:13

## 2023-03-26 RX ADMIN — CYCLOBENZAPRINE 10 MG: 10 TABLET, FILM COATED ORAL at 21:06

## 2023-03-26 RX ADMIN — DICYCLOMINE HYDROCHLORIDE 10 MG: 10 CAPSULE ORAL at 08:13

## 2023-03-26 RX ADMIN — CYCLOBENZAPRINE 10 MG: 10 TABLET, FILM COATED ORAL at 08:13

## 2023-03-26 RX ADMIN — CLONIDINE HYDROCHLORIDE 0.1 MG: 0.1 TABLET ORAL at 08:13

## 2023-03-26 RX ADMIN — ONDANSETRON HYDROCHLORIDE 4 MG: 4 TABLET, FILM COATED ORAL at 14:48

## 2023-03-26 RX ADMIN — IBUPROFEN 400 MG: 400 TABLET, FILM COATED ORAL at 21:06

## 2023-03-26 RX ADMIN — HYDROXYZINE HYDROCHLORIDE 50 MG: 50 TABLET, FILM COATED ORAL at 14:49

## 2023-03-26 NOTE — PLAN OF CARE
"Goal Outcome Evaluation:  Plan of Care Reviewed With: patient  Patient Agreement with Plan of Care: agrees     Progress: improving  Outcome Evaluation: Pt apologized for behavior last night and stated that he's \"feeling better\" today. Pt denies SI/HI/AVH. Pt appeared to sleep throughout the night.  "

## 2023-03-26 NOTE — PLAN OF CARE
Problem: Adult Behavioral Health Plan of Care  Goal: Plan of Care Review  Outcome: Ongoing, Progressing  Flowsheets  Taken 3/26/2023 1836  Progress: improving  Plan of Care Reviewed With: patient  Patient Agreement with Plan of Care: agrees  Outcome Evaluation: Pt pleasant and cooperative.  Taken 3/26/2023 0938  Plan of Care Reviewed With: patient  Patient Agreement with Plan of Care: agrees   Goal Outcome Evaluation:  Plan of Care Reviewed With: patient  Patient Agreement with Plan of Care: agrees     Progress: improving  Outcome Evaluation: Pt pleasant and cooperative.

## 2023-03-26 NOTE — PROGRESS NOTES
"   Detox Recovery Unit Progress Note   Clinician: Tristen Adamson MD  Admission Date: 3/23/2023  07:40 EDT 03/26/23    Behavioral Health Treatment Plan and Problem List: I have reviewed and approved the Behavioral Health Treatment Plan and Problem list.    Allergies  No Known Allergies    Hospital Day: 3 days      Assessment completed within view of staff    History  CC: withdrawal symptoms    Interval HPI: Patient seen and evaluated by me.  Chart reviewed. Patient is withdrawing from opioids.   Current withdrawal symptoms include: trouble sleeping, otherwise he is feeling well.    ROS otherwise as below.        Interval Review of Systems:   General ROS: negative for - fever.  Positive for withdrawal symptoms mentioned above.  Endocrine ROS: negative for - palpitations  Respiratory ROS: no cough, shortness of breath, or wheezing  Cardiovascular ROS: no chest pain or dyspnea on exertion  Gastrointestinal ROS: no abdominal pain,no black or bloody stools    /69 (BP Location: Right arm, Patient Position: Sitting)   Pulse 80   Temp 98.4 °F (36.9 °C) (Temporal)   Resp 18   Ht 172.7 cm (68\")   Wt 60.1 kg (132 lb 6.4 oz)   SpO2 95%   BMI 20.13 kg/m²     Mental Status Exam  Mood: normal  Affect: mood-congruent   Thought Processes: linear, logical, and goal directed  Oriented X3:  yes  Thought Content: sensorium intact   Suicidal Thoughts: denies  Homicidal Thoughts: denies        Medical Decision Making:   Labs:     Lab Results (last 24 hours)     ** No results found for the last 24 hours. **            Radiology:     Imaging Results (Last 24 Hours)     ** No results found for the last 24 hours. **            EKG:     ECG/EMG Results (most recent)     Procedure Component Value Units Date/Time    ECG 12 Lead Other [[id number]] Collected: 03/23/23 9647     Updated: 03/24/23 1334     QT Interval 396 ms      QTC Interval 411 ms     Narrative:      Test Reason : Other~  Blood Pressure :   */*   mmHG  Vent. Rate :  65 " BPM     Atrial Rate :  65 BPM     P-R Int : 122 ms          QRS Dur :  90 ms      QT Int : 396 ms       P-R-T Axes :  -3  77  44 degrees     QTc Int : 411 ms    Normal sinus rhythm  Nonspecific T wave abnormality  Abnormal ECG  No previous ECGs available  Confirmed by Tiny Epstein ([id number]) on 3/24/2023 1:33:56 PM    Referred By:            Confirmed By: Tiny Epstein           Medications:  nicotine, 1 patch, Transdermal, Q24H           All medications reviewed.      Assessment and Plan:    Opioid use disorder, severe, dependence (HCC)  -Continue Clonidine detox protocol     Difficulty with sleep  - Increase trazodone to 150mg QHS     Nicotine use disorder  - Encourage cessation         Continue hospitalization for medical management of drug withdrawal and patient safety and stabilization.  Continue individual and group chemical dependency counseling.   Therapist and treatment team will continue to assist patient in establishing plans for follow-up and rehabilitation that will serve as the next step in care once patient has completed the medical detox.    This note was generated by a scribeAdi. The work documented in this note was completed, reviewed, and approved by the attending psychiatrist as designated Dr. Tristen Adamson electronic signature.     ITristen MD, personally performed the services described in this documentation as scribed by the above named individual and is both accurate and complete.

## 2023-03-27 VITALS
BODY MASS INDEX: 20.07 KG/M2 | TEMPERATURE: 97.1 F | OXYGEN SATURATION: 97 % | DIASTOLIC BLOOD PRESSURE: 93 MMHG | RESPIRATION RATE: 16 BRPM | HEIGHT: 68 IN | SYSTOLIC BLOOD PRESSURE: 139 MMHG | HEART RATE: 77 BPM | WEIGHT: 132.4 LBS

## 2023-03-27 PROCEDURE — 99238 HOSP IP/OBS DSCHRG MGMT 30/<: CPT | Performed by: PSYCHIATRY & NEUROLOGY

## 2023-03-27 PROCEDURE — 63710000001 ONDANSETRON PER 8 MG: Performed by: PSYCHIATRY & NEUROLOGY

## 2023-03-27 RX ADMIN — CLONIDINE HYDROCHLORIDE 0.1 MG: 0.1 TABLET ORAL at 08:44

## 2023-03-27 RX ADMIN — IBUPROFEN 400 MG: 400 TABLET, FILM COATED ORAL at 08:44

## 2023-03-27 RX ADMIN — ONDANSETRON HYDROCHLORIDE 4 MG: 4 TABLET, FILM COATED ORAL at 08:44

## 2023-03-27 RX ADMIN — CYCLOBENZAPRINE 10 MG: 10 TABLET, FILM COATED ORAL at 08:44

## 2023-03-27 NOTE — DISCHARGE SUMMARY
":  2000  MRN:  [id number]  Visit Number:  [id number]      Date of Admission:3/23/2023   Date of Discharge:  3/27/2023    Discharge Diagnosis:  Principal Problem:    Opioid use disorder, severe, dependence (HCC)  Active Problems:    Nicotine use disorder        Admission Diagnosis:  Opiate abuse, continuous (HCC) [F11.10]     ANUJ Bansal is a 22 y.o. male who was admitted on 3/23/2023 with complaints of opioid use and withdrawals.   For details please see H&P dated 3/24/23.    Hospital Course  Patient is a 22 y.o. male presented with opioid use and withdrawals. The patient was admitted to the [facility] detox recovery unit for safety, further evaluation and treatment.  The patient was initially on observation status but as his withdrawal symptoms worsened he was changed to inpatient status and started on clonidine detox. The patient was able to complete the detox without any adverse events.  The patient was also able to take part in individual and group counseling sessions and work on appropriate coping skills.  The patient made steady improvement in his withdrawals and mood and expressed feeling more positive and hopeful about future. Sleep and appetite were improved.  The day of discharge the patient was calm, cooperative and pleasant. Mood was reported to be good, and denied SI/HI/AVH. Also reported no medication side effects.  .      Mental Status Exam upon discharge:   Mood \"good\"   Affect-congruent, appropriate, stable  Thought Content-goal directed, no delusional material present  Thought process-linear, organized.  Suicidality: No SI  Homicidality: No HI  Perception: No AH/VH    Procedures Performed         Consults:   Consults     No orders found from 2023 to 3/24/2023.          Pertinent Test Results:   Admission on 2023   Component Date Value Ref Range Status   • QT Interval 2023 396  ms Final   • QTC Interval 2023 411  ms Final   Admission on 2023, " Discharged on 03/23/2023   Component Date Value Ref Range Status   • Glucose 03/23/2023 107 (H)  65 - 99 mg/dL Final   • BUN 03/23/2023 3 (L)  6 - 20 mg/dL Final   • Creatinine 03/23/2023 0.77  0.76 - 1.27 mg/dL Final   • Sodium 03/23/2023 137  136 - 145 mmol/L Final   • Potassium 03/23/2023 3.6  3.5 - 5.2 mmol/L Final   • Chloride 03/23/2023 99  98 - 107 mmol/L Final   • CO2 03/23/2023 26.4  22.0 - 29.0 mmol/L Final   • Calcium 03/23/2023 9.5  8.6 - 10.5 mg/dL Final   • Total Protein 03/23/2023 6.8  6.0 - 8.5 g/dL Final   • Albumin 03/23/2023 4.6  3.5 - 5.2 g/dL Final   • ALT (SGPT) 03/23/2023 10  1 - 41 U/L Final   • AST (SGOT) 03/23/2023 17  1 - 40 U/L Final   • Alkaline Phosphatase 03/23/2023 82  39 - 117 U/L Final   • Total Bilirubin 03/23/2023 0.5  0.0 - 1.2 mg/dL Final   • Globulin 03/23/2023 2.2  gm/dL Final   • A/G Ratio 03/23/2023 2.1  g/dL Final   • BUN/Creatinine Ratio 03/23/2023 3.9 (L)  7.0 - 25.0 Final   • Anion Gap 03/23/2023 11.6  5.0 - 15.0 mmol/L Final   • eGFR 03/23/2023 131.4  >60.0 mL/min/1.73 Final   • Acetaminophen 03/23/2023 <5.0  0.0 - 30.0 mcg/mL Final   • Ethanol 03/23/2023 <10  0 - 10 mg/dL Final   • Ethanol % 03/23/2023 <0.010  % Final   • Salicylate 03/23/2023 0.4  <=30.0 mg/dL Final   • THC, Screen, Urine 03/23/2023 Positive (A)  Negative Final   • Phencyclidine (PCP), Urine 03/23/2023 Negative  Negative Final   • Cocaine Screen, Urine 03/23/2023 Negative  Negative Final   • Methamphetamine, Ur 03/23/2023 Negative  Negative Final   • Opiate Screen 03/23/2023 Negative  Negative Final   • Amphetamine Screen, Urine 03/23/2023 Negative  Negative Final   • Benzodiazepine Screen, Urine 03/23/2023 Negative  Negative Final   • Tricyclic Antidepressants Screen 03/23/2023 Negative  Negative Final   • Methadone Screen, Urine 03/23/2023 Negative  Negative Final   • Barbiturates Screen, Urine 03/23/2023 Negative  Negative Final   • Oxycodone Screen, Urine 03/23/2023 Negative  Negative Final   •  Propoxyphene Screen 03/23/2023 Negative  Negative Final   • Buprenorphine, Screen, Urine 03/23/2023 Negative  Negative Final   • COVID19 03/23/2023 Not Detected  Not Detected - Ref. Range Final   • Influenza A PCR 03/23/2023 Not Detected  Not Detected Final   • Influenza B PCR 03/23/2023 Not Detected  Not Detected Final   • Extra Tube 03/23/2023 Hold for add-ons.   Final    Auto resulted.   • Extra Tube 03/23/2023 hold for add-on   Final    Auto resulted   • Extra Tube 03/23/2023 Hold for add-ons.   Final    Auto resulted.   • Extra Tube 03/23/2023 Hold for add-ons.   Final    Auto resulted   • WBC 03/23/2023 9.04  3.40 - 10.80 10*3/mm3 Final   • RBC 03/23/2023 4.68  4.14 - 5.80 10*6/mm3 Final   • Hemoglobin 03/23/2023 14.3  13.0 - 17.7 g/dL Final   • Hematocrit 03/23/2023 40.5  37.5 - 51.0 % Final   • MCV 03/23/2023 86.5  79.0 - 97.0 fL Final   • MCH 03/23/2023 30.6  26.6 - 33.0 pg Final   • MCHC 03/23/2023 35.3  31.5 - 35.7 g/dL Final   • RDW 03/23/2023 11.7 (L)  12.3 - 15.4 % Final   • RDW-SD 03/23/2023 37.0  37.0 - 54.0 fl Final   • MPV 03/23/2023 10.3  6.0 - 12.0 fL Final   • Platelets 03/23/2023 245  140 - 450 10*3/mm3 Final   • Neutrophil % 03/23/2023 61.0  42.7 - 76.0 % Final   • Lymphocyte % 03/23/2023 29.2  19.6 - 45.3 % Final   • Monocyte % 03/23/2023 7.1  5.0 - 12.0 % Final   • Eosinophil % 03/23/2023 1.9  0.3 - 6.2 % Final   • Basophil % 03/23/2023 0.7  0.0 - 1.5 % Final   • Immature Grans % 03/23/2023 0.1  0.0 - 0.5 % Final   • Neutrophils, Absolute 03/23/2023 5.52  1.70 - 7.00 10*3/mm3 Final   • Lymphocytes, Absolute 03/23/2023 2.64  0.70 - 3.10 10*3/mm3 Final   • Monocytes, Absolute 03/23/2023 0.64  0.10 - 0.90 10*3/mm3 Final   • Eosinophils, Absolute 03/23/2023 0.17  0.00 - 0.40 10*3/mm3 Final   • Basophils, Absolute 03/23/2023 0.06  0.00 - 0.20 10*3/mm3 Final   • Immature Grans, Absolute 03/23/2023 0.01  0.00 - 0.05 10*3/mm3 Final   • nRBC 03/23/2023 0.0  0.0 - 0.2 /100 WBC Final        Condition  on Discharge:  improved    Vital Signs  Temp:  [97.3 °F (36.3 °C)-98.4 °F (36.9 °C)] 98.1 °F (36.7 °C)  Heart Rate:  [70-96] 79  Resp:  [16-20] 18  BP: (111-150)/(62-87) 114/68      Discharge Disposition:  Rehab Facility or Unit (DC - External)    Discharge Medications:     Discharge Medications      Patient Not Prescribed Medications Upon Discharge         Discharge Diet: Regular     Activity at Discharge: As tolerated     Follow-up Appointments:  [facility]      Time spent in discharge: < 30 min    Clinician:   Yohannes Morin MD  03/27/23  11:00 EDT

## 2023-03-27 NOTE — PLAN OF CARE
Goal Outcome Evaluation:           Progress: improving  Outcome Evaluation: Pt reports he is hoping to be discharged in the am

## 2023-03-27 NOTE — PROGRESS NOTES
Behavioral Health Discharge Summary             Please fax within 24 hours of discharge to [facility] at: [phone]      Member Name: Paresh Bansal Member ID: [id number]   [id number] Phone: [phone]   03/27/2023    Level of Care at Discharge:    Facility: [facility] Staff Completing Form: Juju ELAINE RN U.R.   If the member is being discharged directly to a residential or extended care program, please specify the type below.   __Private Child-Caring Facility (PCC) Residential/Group Home   __Private Child-Caring Facility (PCC) Therapeutic Foster Care   __Residential Treatment Facility (RTF)   __Psychiatric Residential Treatment Facility (PRTF I or II)   __Long-Term Acute Inpatient Hospital Services or Extended Care Unit (ECU)   __Other (please specify):    Brief discharge summary of treatment received (for follow up by the case management team): D/C clinical with list of medications and follow up appts given to patient upon discharge.     BRIEF SUMMARY OF RECOMMENDATIONS FOR ONGOING TREATMENT     Discharged to where: [facility]    Discharge diagnoses: F 11.20   Axis I:    Axis II:    Axis III:    Axis IV:    Axis V:    Does the member understand his/her DX?  Yes          Medication     Dose     Schedule Supply/  Quantity  Given at Discharge RX Provided  Yes/No  If Rx Provided, Quantity RX Prior Auth Required  Yes/No Prior Auth  Completed                                                                                             Does the member understand the reason for taking these medications? Yes                                                           FOLLOW-UP APPOINTMENTS   Please schedule within 7 days of discharge and provide appointment details for all referred services.    PCP/Other Providers Involved in Treatment:    Appointment Type: LACIE AGUILA  Provider Name: [facility] Provider Phone:   [phone] Appointment Date: 03/27/2023 Appointment Time: Admit      Assessment   (new to OP services)        Case Management    Is the member already enrolled in case management?  Yes/No  If yes, date the CM was notified:    If no, was the CM referral offered?  Yes/No  Accepted? Yes/No    Is the Release of Information in the chart? Yes/No:      Medication Management (for member discharged with psychiatric medications):      A&D Treatment (for member with substance abuse/   dependence in the past year):      Medical Condition (for member with a medical condition):    Other recommended treatment:    Do you have any concerns about the discharge plan?  No    If yes, explain:    Was the member involved in the discharge planning?  Yes    If no, explain:    Was a copy of the discharge plan provided to the member?  Yes    If no, explain:

## 2023-03-27 NOTE — PLAN OF CARE
Goal Outcome Evaluation:  Plan of Care Reviewed With: patient  Patient Agreement with Plan of Care: agrees     Progress: improving  Outcome Evaluation: Pt reports anxiety as 3/10 and depression 2/10. Pt denies SI/HI/AVH/Cravings/withdrawal symptoms. Pt reports a fair appetite and good sleep stating he got about 7 hours of sleep last night.Pt is discharging and going to [facility] recovery.

## 2023-03-27 NOTE — PROGRESS NOTES
Navigator received call from Pallavi with [facility]. They will accept patient today.  from 1:00pm-1:30pm.     [facility] - [phone]

## 2023-03-27 NOTE — CASE MANAGEMENT/SOCIAL WORK
Case Management/Social Work    Patient Name:  Paresh Bansal  YOB: 2000  MRN: [id number]  Admit Date:  3/23/2023    Patient is being discharged to [facility] on this date, transportation scheduled for 13:00-13:30. Patient reports improvement in mood and withdrawal symptoms, denies current SI/HI/AVH. Therapist has discussed healthy coping skills, safety planning, and relapse prevention with patient. Therapist has assisted patient in identifying risk factors which may indicate the need for higher level of care including thoughts to harm self or others and/or self-harming behavior. Encouraged patient to notify [facility] staff, call [phone] or present to the nearest emergency room should any of these events occur. Discussed crisis intervention services and means to access. No other needs identified.      Electronically signed by:  CHLOÉ Rivas  03/27/23 11:16 EDT